# Patient Record
Sex: MALE | Race: WHITE
[De-identification: names, ages, dates, MRNs, and addresses within clinical notes are randomized per-mention and may not be internally consistent; named-entity substitution may affect disease eponyms.]

---

## 2019-11-05 ENCOUNTER — HOSPITAL ENCOUNTER (OUTPATIENT)
Dept: HOSPITAL 92 - SCSRAD | Age: 19
Discharge: HOME | End: 2019-11-05
Payer: COMMERCIAL

## 2019-11-05 DIAGNOSIS — R10.31: Primary | ICD-10-CM

## 2019-11-05 PROCEDURE — 76705 ECHO EXAM OF ABDOMEN: CPT

## 2019-11-05 NOTE — ULT
Exam:

Limited abdominal ultrasound:



HISTORY:

Right lower quadrant pain, right inguinal pain, concern for hernia



COMPARISON:

None



FINDINGS:

Right lower quadrant and right inguinal canal region and region of pain as evaluated. No evidence for
 a bowel containing hernia. No abscess or abnormal fluid collection.



IMPRESSION:

Unremarkable limited ultrasound examination of the right lower quadrant and right inguinal canal does
 not demonstrate any bowel containing hernia.



Consideration for follow-up CT scan might be of benefit which would be more sensitive in evaluating f
at-containing hernias.



Reported By: Kenneth Stevens 

Electronically Signed:  11/5/2019 11:13 AM